# Patient Record
Sex: FEMALE | Race: WHITE | NOT HISPANIC OR LATINO
[De-identification: names, ages, dates, MRNs, and addresses within clinical notes are randomized per-mention and may not be internally consistent; named-entity substitution may affect disease eponyms.]

---

## 2017-06-28 ENCOUNTER — APPOINTMENT (OUTPATIENT)
Dept: SURGERY | Facility: CLINIC | Age: 52
End: 2017-06-28

## 2017-06-28 VITALS
WEIGHT: 138 LBS | SYSTOLIC BLOOD PRESSURE: 154 MMHG | HEIGHT: 65 IN | HEART RATE: 64 BPM | BODY MASS INDEX: 22.99 KG/M2 | DIASTOLIC BLOOD PRESSURE: 78 MMHG

## 2017-06-28 DIAGNOSIS — M41.9 SCOLIOSIS, UNSPECIFIED: ICD-10-CM

## 2017-06-28 DIAGNOSIS — Z80.0 FAMILY HISTORY OF MALIGNANT NEOPLASM OF DIGESTIVE ORGANS: ICD-10-CM

## 2017-06-28 DIAGNOSIS — Z86.69 PERSONAL HISTORY OF OTHER DISEASES OF THE NERVOUS SYSTEM AND SENSE ORGANS: ICD-10-CM

## 2017-06-28 DIAGNOSIS — F41.9 ANXIETY DISORDER, UNSPECIFIED: ICD-10-CM

## 2017-07-01 PROBLEM — Z80.0 FAMILY HISTORY OF MALIGNANT NEOPLASM OF ESOPHAGUS: Status: ACTIVE | Noted: 2017-07-01

## 2017-07-01 PROBLEM — M41.9 SEVERE SCOLIOSIS: Status: RESOLVED | Noted: 2017-07-01 | Resolved: 2017-07-01

## 2017-07-01 PROBLEM — F41.9 ANXIETY: Status: RESOLVED | Noted: 2017-07-01 | Resolved: 2017-07-01

## 2017-07-01 PROBLEM — Z86.69 HISTORY OF MIGRAINE: Status: RESOLVED | Noted: 2017-07-01 | Resolved: 2017-07-01

## 2017-07-14 ENCOUNTER — RESULT REVIEW (OUTPATIENT)
Age: 52
End: 2017-07-14

## 2017-07-25 ENCOUNTER — OUTPATIENT (OUTPATIENT)
Dept: OUTPATIENT SERVICES | Facility: HOSPITAL | Age: 52
LOS: 1 days | End: 2017-07-25

## 2017-07-25 VITALS
HEART RATE: 67 BPM | HEIGHT: 65 IN | SYSTOLIC BLOOD PRESSURE: 120 MMHG | DIASTOLIC BLOOD PRESSURE: 80 MMHG | TEMPERATURE: 98 F | OXYGEN SATURATION: 99 % | WEIGHT: 141.98 LBS | RESPIRATION RATE: 16 BRPM

## 2017-07-25 DIAGNOSIS — E04.2 NONTOXIC MULTINODULAR GOITER: ICD-10-CM

## 2017-07-25 DIAGNOSIS — Z98.890 OTHER SPECIFIED POSTPROCEDURAL STATES: Chronic | ICD-10-CM

## 2017-07-25 DIAGNOSIS — M20.12 HALLUX VALGUS (ACQUIRED), LEFT FOOT: Chronic | ICD-10-CM

## 2017-07-25 DIAGNOSIS — M41.9 SCOLIOSIS, UNSPECIFIED: ICD-10-CM

## 2017-07-25 DIAGNOSIS — Z86.018 PERSONAL HISTORY OF OTHER BENIGN NEOPLASM: Chronic | ICD-10-CM

## 2017-07-25 LAB
HCG SERPL-ACNC: 5.18 MIU/ML — SIGNIFICANT CHANGE UP
HCT VFR BLD CALC: 36.4 % — SIGNIFICANT CHANGE UP (ref 34.5–45)
HGB BLD-MCNC: 12.5 G/DL — SIGNIFICANT CHANGE UP (ref 11.5–15.5)
MCHC RBC-ENTMCNC: 31.4 PG — SIGNIFICANT CHANGE UP (ref 27–34)
MCHC RBC-ENTMCNC: 34.3 % — SIGNIFICANT CHANGE UP (ref 32–36)
MCV RBC AUTO: 91.5 FL — SIGNIFICANT CHANGE UP (ref 80–100)
NRBC # FLD: 0 — SIGNIFICANT CHANGE UP
PLATELET # BLD AUTO: 249 K/UL — SIGNIFICANT CHANGE UP (ref 150–400)
PMV BLD: 10.8 FL — SIGNIFICANT CHANGE UP (ref 7–13)
RBC # BLD: 3.98 M/UL — SIGNIFICANT CHANGE UP (ref 3.8–5.2)
RBC # FLD: 12.4 % — SIGNIFICANT CHANGE UP (ref 10.3–14.5)
WBC # BLD: 6.25 K/UL — SIGNIFICANT CHANGE UP (ref 3.8–10.5)
WBC # FLD AUTO: 6.25 K/UL — SIGNIFICANT CHANGE UP (ref 3.8–10.5)

## 2017-07-25 RX ORDER — SODIUM CHLORIDE 9 MG/ML
3 INJECTION INTRAMUSCULAR; INTRAVENOUS; SUBCUTANEOUS ONCE
Qty: 0 | Refills: 0 | Status: DISCONTINUED | OUTPATIENT
Start: 2017-08-10 | End: 2017-08-11

## 2017-07-25 RX ORDER — IBUPROFEN 200 MG
1 TABLET ORAL
Qty: 0 | Refills: 0 | COMMUNITY

## 2017-07-25 NOTE — H&P PST ADULT - HISTORY OF PRESENT ILLNESS
52yo female denies significant medical history, reports feeling lump to right neck region in 7/17/2017. Pt presents today for presurgical evaluation for Right Thyroid Lobectomy, Possible Total scheduled for 8/10/2017.

## 2017-07-25 NOTE — H&P PST ADULT - NEGATIVE MUSCULOSKELETAL SYMPTOMS
no arm pain R/no leg pain R/no joint swelling/no neck pain/no muscle weakness/no arm pain L/no arthritis/no muscle cramps/no myalgia/no leg pain L/no stiffness

## 2017-07-25 NOTE — H&P PST ADULT - LYMPHATIC
posterior cervical L/posterior cervical R/anterior cervical R/supraclavicular R/supraclavicular L/anterior cervical L

## 2017-07-25 NOTE — H&P PST ADULT - MUSCULOSKELETAL
details… detailed exam ROM intact/normal strength/no joint warmth/no joint swelling/no calf tenderness/no joint erythema

## 2017-07-25 NOTE — H&P PST ADULT - NSANTHOSAYNRD_GEN_A_CORE
No. RAMSES screening performed.  STOP BANG Legend: 0-2 = LOW Risk; 3-4 = INTERMEDIATE Risk; 5-8 = HIGH Risk

## 2017-07-25 NOTE — H&P PST ADULT - PSH
H/O endoscopy  tracheo-bronchitis - nothing detected  H/O lipoma  remoavl from forehead 3/2016  Hallux valgus with bunions of left foot  bilateral foot bunion and heel spurs removal  History of D&C  1994

## 2017-08-10 ENCOUNTER — RESULT REVIEW (OUTPATIENT)
Age: 52
End: 2017-08-10

## 2017-08-10 ENCOUNTER — OUTPATIENT (OUTPATIENT)
Dept: OUTPATIENT SERVICES | Facility: HOSPITAL | Age: 52
LOS: 1 days | Discharge: HOME | End: 2017-08-10
Payer: COMMERCIAL

## 2017-08-10 ENCOUNTER — TRANSCRIPTION ENCOUNTER (OUTPATIENT)
Age: 52
End: 2017-08-10

## 2017-08-10 ENCOUNTER — APPOINTMENT (OUTPATIENT)
Dept: SURGERY | Facility: HOSPITAL | Age: 52
End: 2017-08-10

## 2017-08-10 VITALS
DIASTOLIC BLOOD PRESSURE: 76 MMHG | SYSTOLIC BLOOD PRESSURE: 128 MMHG | OXYGEN SATURATION: 100 % | TEMPERATURE: 99 F | HEART RATE: 65 BPM | RESPIRATION RATE: 12 BRPM

## 2017-08-10 VITALS
TEMPERATURE: 98 F | WEIGHT: 141.98 LBS | DIASTOLIC BLOOD PRESSURE: 73 MMHG | HEART RATE: 68 BPM | RESPIRATION RATE: 16 BRPM | OXYGEN SATURATION: 99 % | SYSTOLIC BLOOD PRESSURE: 126 MMHG | HEIGHT: 65 IN

## 2017-08-10 DIAGNOSIS — Z98.890 OTHER SPECIFIED POSTPROCEDURAL STATES: Chronic | ICD-10-CM

## 2017-08-10 DIAGNOSIS — E04.2 NONTOXIC MULTINODULAR GOITER: ICD-10-CM

## 2017-08-10 DIAGNOSIS — M20.12 HALLUX VALGUS (ACQUIRED), LEFT FOOT: Chronic | ICD-10-CM

## 2017-08-10 DIAGNOSIS — Z86.018 PERSONAL HISTORY OF OTHER BENIGN NEOPLASM: Chronic | ICD-10-CM

## 2017-08-10 PROCEDURE — 60220 PARTIAL REMOVAL OF THYROID: CPT

## 2017-08-10 PROCEDURE — 88307 TISSUE EXAM BY PATHOLOGIST: CPT | Mod: 26

## 2017-08-10 PROCEDURE — 13132 CMPLX RPR F/C/C/M/N/AX/G/H/F: CPT | Mod: 59

## 2017-08-10 RX ORDER — GLYCERIN 1 %
1 DROPS OPHTHALMIC (EYE)
Qty: 0 | Refills: 0 | COMMUNITY

## 2017-08-10 RX ORDER — ACETAMINOPHEN 500 MG
650 TABLET ORAL EVERY 6 HOURS
Qty: 0 | Refills: 0 | Status: DISCONTINUED | OUTPATIENT
Start: 2017-08-10 | End: 2017-08-11

## 2017-08-10 RX ORDER — BENZOCAINE AND MENTHOL 5; 1 G/100ML; G/100ML
1 LIQUID ORAL
Qty: 0 | Refills: 0 | Status: DISCONTINUED | OUTPATIENT
Start: 2017-08-10 | End: 2017-08-11

## 2017-08-10 RX ORDER — SODIUM CHLORIDE 9 MG/ML
1000 INJECTION, SOLUTION INTRAVENOUS
Qty: 0 | Refills: 0 | Status: DISCONTINUED | OUTPATIENT
Start: 2017-08-10 | End: 2017-08-10

## 2017-08-10 RX ORDER — ACETAMINOPHEN 500 MG
2 TABLET ORAL
Qty: 0 | Refills: 0 | COMMUNITY
Start: 2017-08-10

## 2017-08-10 RX ORDER — SODIUM CHLORIDE 9 MG/ML
1000 INJECTION, SOLUTION INTRAVENOUS
Qty: 0 | Refills: 0 | Status: DISCONTINUED | OUTPATIENT
Start: 2017-08-10 | End: 2017-08-11

## 2017-08-10 RX ORDER — IBUPROFEN 200 MG
1 TABLET ORAL
Qty: 0 | Refills: 0 | COMMUNITY

## 2017-08-10 RX ORDER — BENZOCAINE AND MENTHOL 5; 1 G/100ML; G/100ML
1 LIQUID ORAL
Qty: 0 | Refills: 0 | COMMUNITY
Start: 2017-08-10

## 2017-08-10 NOTE — ASU DISCHARGE PLAN (ADULT/PEDIATRIC). - NURSING INSTRUCTIONS
See medication reconcilliation record  You were given an antibiotic (          ) in OR today about   You were given IV Tylenol for pain management.  Please DO NOT take tylenol for the next 6-8 hours (after _ ). Please do not exceed 3000mg in 24hours. See medication reconcilliation record  You were given an antibiotic ( Cefazolin 2000mg ) in OR today about 8:30am  You were given IV Tylenol for pain management.  Please DO NOT take tylenol for the next 6-8 hours (after 3pm ). Please do not exceed 3000mg in 24hours.

## 2017-08-10 NOTE — ASU DISCHARGE PLAN (ADULT/PEDIATRIC). - COMMENTS
Surgical Unit will call you on the next business day to follow up. [facility] is open Monday - Friday.

## 2017-08-10 NOTE — ASU DISCHARGE PLAN (ADULT/PEDIATRIC). - NOTIFY
Bleeding that does not stop/Fever greater than 101 Swelling that continues/Pain not relieved by Medications/Fever greater than 101/Inability to Tolerate Liquids or Foods/Persistent Nausea and Vomiting/Bleeding that does not stop/Unable to Urinate/Numbness, tingling

## 2017-08-10 NOTE — ASU DISCHARGE PLAN (ADULT/PEDIATRIC). - MEDICATION SUMMARY - MEDICATIONS TO TAKE
I will START or STAY ON the medications listed below when I get home from the hospital:    acetaminophen 325 mg oral tablet  -- 2 tab(s) by mouth every 6 hours, As needed, Moderate Pain (4 - 6)  -- Indication: For pain    benzocaine-menthol 15 mg-3.6 mg mucous membrane lozenge  -- 1  mucous membrane every 4 hours, As Needed  -- Indication: For pain    Opcon-A 0.027%-0.315% ophthalmic solution  -- 1 drop(s) to each affected eye once a day  -- Indication: For home

## 2017-08-10 NOTE — ASU DISCHARGE PLAN (ADULT/PEDIATRIC). - MEDICATION SUMMARY - MEDICATIONS TO STOP TAKING
I will STOP taking the medications listed below when I get home from the hospital:    Advil Kana Strength 100 mg oral tablet  -- 1 tab(s) by mouth prn, As Needed 8/3/17

## 2017-08-15 LAB — SURGICAL PATHOLOGY STUDY: SIGNIFICANT CHANGE UP

## 2017-08-16 ENCOUNTER — APPOINTMENT (OUTPATIENT)
Dept: SURGERY | Facility: CLINIC | Age: 52
End: 2017-08-16
Payer: COMMERCIAL

## 2017-08-16 DIAGNOSIS — D49.7 NEOPLASM OF UNSPECIFIED BEHAVIOR OF ENDOCRINE GLANDS AND OTHER PARTS OF NERVOUS SYSTEM: ICD-10-CM

## 2017-08-16 PROCEDURE — 99024 POSTOP FOLLOW-UP VISIT: CPT

## 2017-12-27 ENCOUNTER — APPOINTMENT (OUTPATIENT)
Dept: SURGERY | Facility: CLINIC | Age: 52
End: 2017-12-27
Payer: COMMERCIAL

## 2017-12-27 DIAGNOSIS — D34 BENIGN NEOPLASM OF THYROID GLAND: ICD-10-CM

## 2017-12-27 PROCEDURE — 99214 OFFICE O/P EST MOD 30 MIN: CPT

## 2020-03-12 NOTE — H&P PST ADULT - PMH
[id number] [facility] care of patient at shift change. Awaiting duplex study. Plan to discharge pt home if negative. 0162  Duplex negative for DVT. Stable for discharge and prompt f/u with PCP in 1-2 days.
Patient laying with eyes closed. Respirations even and unlabored.
Pt states she needs to use the bathroom.   Pt inforned she has the pure wick placed and she could pee
Pt woken from sleep. Has no complaint or request at this time. No urine from pure wick noted in cannister.    Pt states she has not had to urinate
Nontoxic multinodular goiter    Scoliosis

## 2020-12-10 ENCOUNTER — TRANSCRIPTION ENCOUNTER (OUTPATIENT)
Age: 55
End: 2020-12-10

## 2021-01-08 ENCOUNTER — TRANSCRIPTION ENCOUNTER (OUTPATIENT)
Age: 56
End: 2021-01-08

## 2021-07-22 ENCOUNTER — TRANSCRIPTION ENCOUNTER (OUTPATIENT)
Age: 56
End: 2021-07-22

## 2022-05-09 PROBLEM — E04.2 NONTOXIC MULTINODULAR GOITER: Chronic | Status: ACTIVE | Noted: 2017-07-25

## 2022-05-09 PROBLEM — M41.9 SCOLIOSIS, UNSPECIFIED: Chronic | Status: ACTIVE | Noted: 2017-07-25

## 2022-05-13 ENCOUNTER — APPOINTMENT (OUTPATIENT)
Dept: OBGYN | Facility: CLINIC | Age: 57
End: 2022-05-13

## 2022-06-23 ENCOUNTER — APPOINTMENT (OUTPATIENT)
Dept: OBGYN | Facility: CLINIC | Age: 57
End: 2022-06-23

## 2022-06-23 ENCOUNTER — APPOINTMENT (OUTPATIENT)
Dept: OBGYN | Facility: CLINIC | Age: 57
End: 2022-06-23
Payer: COMMERCIAL

## 2022-06-23 VITALS
WEIGHT: 126 LBS | DIASTOLIC BLOOD PRESSURE: 70 MMHG | BODY MASS INDEX: 20.97 KG/M2 | HEART RATE: 63 BPM | SYSTOLIC BLOOD PRESSURE: 117 MMHG

## 2022-06-23 DIAGNOSIS — Z00.00 ENCOUNTER FOR GENERAL ADULT MEDICAL EXAMINATION W/OUT ABNORMAL FINDINGS: ICD-10-CM

## 2022-06-23 PROCEDURE — 99396 PREV VISIT EST AGE 40-64: CPT

## 2022-06-23 NOTE — PHYSICAL EXAM
[Appropriately responsive] : appropriately responsive [Alert] : alert [No Acute Distress] : no acute distress [No Lymphadenopathy] : no lymphadenopathy [Regular Rate Rhythm] : regular rate rhythm [No Murmurs] : no murmurs [Clear to Auscultation B/L] : clear to auscultation bilaterally [Soft] : soft [Non-tender] : non-tender [Non-distended] : non-distended [No HSM] : No HSM [No Lesions] : no lesions [No Mass] : no mass [Oriented x3] : oriented x3 [FreeTextEntry6] : No masses, nontender, no skin changes, nipple discharge, no adenopathy. [Examination Of The Breasts] : a normal appearance [No Masses] : no breast masses were palpable [Vulvar Atrophy] : vulvar atrophy [Labia Majora] : normal [Labia Minora] : normal [Atrophy] : atrophy [Normal] : normal [Tenderness] : nontender [Anteversion] : anteverted [Mass ___ cm] : no uterine mass was palpated [Uterine Adnexae] : normal

## 2022-06-23 NOTE — HISTORY OF PRESENT ILLNESS
[FreeTextEntry1] : Patient is a 56-year-old  4 para 2-0-2-2 last menstrual period  14\par Patient presents for annual visit,,, denies any complaints

## 2022-06-30 LAB — HPV HIGH+LOW RISK DNA PNL CVX: NOT DETECTED

## 2022-07-22 ENCOUNTER — APPOINTMENT (OUTPATIENT)
Dept: OBGYN | Facility: CLINIC | Age: 57
End: 2022-07-22

## 2022-07-22 ENCOUNTER — ASOB RESULT (OUTPATIENT)
Age: 57
End: 2022-07-22

## 2022-07-22 DIAGNOSIS — N84.0 POLYP OF CORPUS UTERI: ICD-10-CM

## 2022-07-22 PROCEDURE — 99213 OFFICE O/P EST LOW 20 MIN: CPT | Mod: 25

## 2022-07-22 PROCEDURE — 76856 US EXAM PELVIC COMPLETE: CPT

## 2022-07-22 PROCEDURE — 76830 TRANSVAGINAL US NON-OB: CPT

## 2022-07-22 NOTE — HISTORY OF PRESENT ILLNESS
[FreeTextEntry1] : Patient is a 56-year-old  4 para 2-0-2-2 last menstrual period 1214\par Patient presents for follow-up sonogram valuation of endometrial polyp
AVULSION

## 2022-07-22 NOTE — PLAN
[FreeTextEntry1] : Patient is a 56-year-old  4 para 2-0-2-2 last menstrual period  patient was diagnosed of endometrial polyp and had an attempted hysteroscopy polypectomy back in 2021,,, unfortunately the due to the acute angle of the uterine cavity and stenotic internal os the endometrium was not able to be penetrated for the proper polyp removal\par Pelvic sonogram\par Uterus 6.2 x 4.4 x 3.1,,, endometrium 5 mm\par Right ovary 1.8 x 2.0 x 1.2\par Left ovary 1.6 x 1.5 x 0.8\par Endometrial polyp 9 mm x 5 mm,,, this represents a decrease in size from the previous sonohysterography studies\par Results discussed with patient\par Patient denies any vaginal bleeding\par Reassured\par Follow-up 6 months\par Discussed the option of reattempting hysteroscopy and possible hysterectomy but this is not advised

## 2023-01-09 ENCOUNTER — ASOB RESULT (OUTPATIENT)
Age: 58
End: 2023-01-09

## 2023-01-09 ENCOUNTER — APPOINTMENT (OUTPATIENT)
Dept: OBGYN | Facility: CLINIC | Age: 58
End: 2023-01-09
Payer: COMMERCIAL

## 2023-01-09 VITALS
SYSTOLIC BLOOD PRESSURE: 129 MMHG | DIASTOLIC BLOOD PRESSURE: 65 MMHG | WEIGHT: 127 LBS | HEART RATE: 84 BPM | HEIGHT: 66 IN | BODY MASS INDEX: 20.41 KG/M2

## 2023-01-09 PROCEDURE — 76830 TRANSVAGINAL US NON-OB: CPT

## 2023-01-09 PROCEDURE — 99213 OFFICE O/P EST LOW 20 MIN: CPT

## 2023-01-09 NOTE — HISTORY OF PRESENT ILLNESS
[FreeTextEntry1] : Patient is 57-year-old  4 para 2-0-2-2 last menstrual period thousand 14\par Patient presents for follow-up sonogram valuation of possible endometrial polyp

## 2023-01-09 NOTE — PLAN
[FreeTextEntry1] : Patient is a 57-year-old  4 para 2-0-2-2 last menstrual period \par Patient presents for follow-up evaluation of possible Mini polyp\par Patient was found to have a polyp back in 6 months prior at that point attempted hysteroscopy for polypectomy was unsuccessful because of stenotic cervix and patient is being followed by serial sonograms\par Patient denies any vaginal bleeding\par Pelvic sonogram\par Uterus 4.24 x 5.38 x 2.8\par Cervical length 2.42\par Endometrial thickness 4.4 mm,,, possible small polyp measuring 1.79 x 1.8 0.50\par No free fluid in cul-de-sac\par Right ovary 0.93 x 1.74 x 1.13\par Left ovary 1.64 x 1.83 x 0.76\par Results compared to the previous sonogram 6 months prior and uterine size and possible endometrial polyp have appeared to be decreased in size\par Since patient does not exhibit any vaginal bleeding,, patient will be monitored conservatively\par Follow-up 6 months for full annual exam and follow-up sonogram,,, patient advised that if there is any evidence of bleeding to return immediately\par Questions answered\par Patient that she understands

## 2023-01-23 ENCOUNTER — APPOINTMENT (OUTPATIENT)
Dept: OBGYN | Facility: CLINIC | Age: 58
End: 2023-01-23

## 2023-06-26 ENCOUNTER — ASOB RESULT (OUTPATIENT)
Age: 58
End: 2023-06-26

## 2023-06-26 ENCOUNTER — APPOINTMENT (OUTPATIENT)
Dept: OBGYN | Facility: CLINIC | Age: 58
End: 2023-06-26
Payer: COMMERCIAL

## 2023-06-26 VITALS
DIASTOLIC BLOOD PRESSURE: 76 MMHG | WEIGHT: 128 LBS | HEIGHT: 66 IN | SYSTOLIC BLOOD PRESSURE: 121 MMHG | BODY MASS INDEX: 20.57 KG/M2 | HEART RATE: 65 BPM

## 2023-06-26 DIAGNOSIS — U07.1 COVID-19: ICD-10-CM

## 2023-06-26 PROCEDURE — 76856 US EXAM PELVIC COMPLETE: CPT | Mod: 59

## 2023-06-26 PROCEDURE — 99396 PREV VISIT EST AGE 40-64: CPT

## 2023-06-26 PROCEDURE — 76830 TRANSVAGINAL US NON-OB: CPT

## 2023-06-26 NOTE — PLAN
[FreeTextEntry1] : Patient is a 57-year-old  4 para 2-0-2-2 last menstrual period \par Patient presents for annual visit,, denies any complaints\par Patient does have history of endometrial polyp and will undergo a pelvic sonogram at the completion of today's exam\par Physical exam reveals a well-developed well-nourished female no apparent distress,, BMI 21\par Heart regular rhythm and rate, lungs clear, breast no mass nontender no skin changes no nipple discharge no adenopathy, abdomen soft nontender no organomegaly.\par Pelvic exam shows normal female external genitalia, vagina no lesions, cervix appropriate size nontender, uterus anteverted normal size nontender, adnexa no mass nontender.\par PEx was performed\par Patient states she had a mammogram performed in April of this year and will obtain the results\par Patient states she has colonoscopy scheduled in 2 months from now\par Patient does state she had COVID diagnosis back in May 2022 and then again 2022,, denies any residual symptoms or deficits,, states has been vaccinated but not boosted\par Essentially benign joint exam\par \par Lucretia was present as a chaperone during the entire assessment and exam of this patient\par \par Pelvic sonogram\par Uterus 6.24 x 4.9 x 2.96\par Cervical length 1.83 cm\par Endometrial thickness 4.5 mm\par No adnexal masses seen\par No free fluid seen\par Left ovary not seen\par Right ovary 2.24 x 0.94 x 1.18 with a simple cyst measuring 0.3 x 0.31 x 0.31\par Endometrial cyst is visualized measuring 0.56 x 0.59 x 0.32\par A small structure endometrial cavity measures 0.99 x 0.54 x 0.84 consider the possible polyp as seen prior shows no change and no flow Doppler seen\par Results discussed with patient\par Reassured\par Follow-up 1 year\par \par

## 2023-06-26 NOTE — HISTORY OF PRESENT ILLNESS
[FreeTextEntry1] : Patient is a 57-year-old  4 para 2-0-2-2 last menstrual period \par Patient presents for annual visit,, denies any complaints\par Patient has history of endometrial polyp and will be undergoing a sonogram at the completion of today's visit

## 2023-06-26 NOTE — PHYSICAL EXAM
[Chaperone Present] : A chaperone was present in the examining room during all aspects of the physical examination [Appropriately responsive] : appropriately responsive [Alert] : alert [No Acute Distress] : no acute distress [No Lymphadenopathy] : no lymphadenopathy [Regular Rate Rhythm] : regular rate rhythm [No Murmurs] : no murmurs [Clear to Auscultation B/L] : clear to auscultation bilaterally [Soft] : soft [Non-tender] : non-tender [Non-distended] : non-distended [No HSM] : No HSM [No Lesions] : no lesions [No Mass] : no mass [Oriented x3] : oriented x3 [Examination Of The Breasts] : a normal appearance [No Masses] : no breast masses were palpable [Labia Majora] : normal [Labia Minora] : normal [Normal] : normal [Anteversion] : anteverted [Uterine Adnexae] : normal [FreeTextEntry6] : No masses, nontender, no skin changes, no nipple discharge,.  No adenopathy [Tenderness] : nontender [Mass ___ cm] : no uterine mass was palpated

## 2023-07-03 LAB — HPV HIGH+LOW RISK DNA PNL CVX: NOT DETECTED

## 2024-08-06 ENCOUNTER — ASOB RESULT (OUTPATIENT)
Age: 59
End: 2024-08-06

## 2024-08-06 ENCOUNTER — APPOINTMENT (OUTPATIENT)
Dept: OBGYN | Facility: CLINIC | Age: 59
End: 2024-08-06

## 2024-08-06 PROCEDURE — 76830 TRANSVAGINAL US NON-OB: CPT

## 2024-08-06 PROCEDURE — 99396 PREV VISIT EST AGE 40-64: CPT

## 2024-08-06 PROCEDURE — 99459 PELVIC EXAMINATION: CPT

## 2024-08-06 PROCEDURE — 76856 US EXAM PELVIC COMPLETE: CPT | Mod: 59

## 2024-08-06 NOTE — HISTORY OF PRESENT ILLNESS
[FreeTextEntry1] : Patient is a 58-year-old  4 para 2-0-2-2 last menstrual period 714 Patient presents for annual visit and follow-up sonogram for evaluation of possible endometrial polyp

## 2024-08-06 NOTE — PLAN
[FreeTextEntry1] : Patient is a 58-year-old  4 para 2-0-2-2 last menstrual period  Patient presents for annual exam, denies any complaints Patient also needs a follow-up sonogram for evaluation of possible endometrial polyp,,, patient denies any bleeding Physical exam reveals a well-developed well-nourished female no apparent distress,, BMI 26 Heart regular rhythm and rate, lungs clear, breast no mass nontender no skin change or nipple chart adenopathy, abdomen soft nontender no organomegaly Back exam shows normal female external genitalia, vagina lesions, cervix appropriate size nontender, uterus anteverted normal size nontender, adnexa no mass nontender Pap smear was performed Patient states she had a mammogram sonogram performed approximately 1 week prior along with a bone density and with negative findings and will forward over results Patient states she has colonoscopy to be scheduled Pelvic sonogram Uterus 4.48 x 5.0 x 2.99 Cervical length 2.14 cm Endometrial thickness 2.3 mm Right ovary 2.75 x 2.08 x 3 0.88 with a small cyst measuring 0.3 x 0.31 x 0.31 with normal color Doppler flow Left ovary 2.43 x 2.62 x 1.01 with normal color Doppler flow No free fluid No Masses No evidence of intra polyp with evidence of a small endometrial cyst measuring 0.29 x 0.35 x 0.16 Results discussed with patient Reassured Follow-up 1 year or prior to that as needed  Carmelita was present as a chaperone for the entire assessment and examination of this patient

## 2024-08-06 NOTE — PHYSICAL EXAM
[Chaperone Present] : A chaperone was present in the examining room during all aspects of the physical examination [14722] : A chaperone was present during the pelvic exam. [FreeTextEntry2] : Carmelita [Appropriately responsive] : appropriately responsive [Alert] : alert [No Acute Distress] : no acute distress [No Lymphadenopathy] : no lymphadenopathy [Regular Rate Rhythm] : regular rate rhythm [No Murmurs] : no murmurs [Clear to Auscultation B/L] : clear to auscultation bilaterally [Soft] : soft [Non-tender] : non-tender [Non-distended] : non-distended [No HSM] : No HSM [No Lesions] : no lesions [No Mass] : no mass [Oriented x3] : oriented x3 [FreeTextEntry6] : No masses, nontender, no skin changes, no nipple discharge, no adenopathy [Examination Of The Breasts] : a normal appearance [No Masses] : no breast masses were palpable [Labia Majora] : normal [Labia Minora] : normal [Normal] : normal [Tenderness] : nontender [Anteversion] : anteverted [Mass ___ cm] : no uterine mass was palpated [Uterine Adnexae] : normal

## 2024-08-08 PROBLEM — Z12.4 ENCOUNTER FOR PAPANICOLAOU SMEAR FOR CERVICAL CANCER SCREENING: Status: ACTIVE | Noted: 2024-08-08

## 2024-08-08 PROBLEM — Z11.51 SCREENING FOR HPV (HUMAN PAPILLOMAVIRUS): Status: ACTIVE | Noted: 2024-08-08

## 2024-08-08 PROBLEM — Z01.419 PAPANICOLAOU SMEAR, AS PART OF ROUTINE GYNECOLOGICAL EXAMINATION: Status: ACTIVE | Noted: 2024-08-08
